# Patient Record
Sex: FEMALE | Race: BLACK OR AFRICAN AMERICAN | Employment: FULL TIME | URBAN - METROPOLITAN AREA
[De-identification: names, ages, dates, MRNs, and addresses within clinical notes are randomized per-mention and may not be internally consistent; named-entity substitution may affect disease eponyms.]

---

## 2017-09-13 ENCOUNTER — HOSPITAL ENCOUNTER (OUTPATIENT)
Dept: GENERAL RADIOLOGY | Facility: HOSPITAL | Age: 32
Discharge: HOME | End: 2017-09-13
Attending: PREVENTIVE MEDICINE
Payer: OTHER MISCELLANEOUS

## 2017-09-13 ENCOUNTER — OFFICE VISIT (OUTPATIENT)
Dept: OTHER | Facility: HOSPITAL | Age: 32
End: 2017-09-13
Attending: PREVENTIVE MEDICINE
Payer: OTHER MISCELLANEOUS

## 2017-09-13 DIAGNOSIS — M25.571 ACUTE RIGHT ANKLE PAIN: ICD-10-CM

## 2017-09-13 DIAGNOSIS — M79.671 RIGHT FOOT PAIN: Primary | ICD-10-CM

## 2017-09-13 PROCEDURE — 73610 X-RAY EXAM OF ANKLE: CPT | Performed by: PREVENTIVE MEDICINE

## 2017-09-13 PROCEDURE — 73630 X-RAY EXAM OF FOOT: CPT | Performed by: PREVENTIVE MEDICINE

## 2017-09-15 ENCOUNTER — APPOINTMENT (OUTPATIENT)
Dept: OTHER | Facility: HOSPITAL | Age: 32
End: 2017-09-15
Attending: PREVENTIVE MEDICINE
Payer: OTHER MISCELLANEOUS

## 2017-09-20 ENCOUNTER — APPOINTMENT (OUTPATIENT)
Dept: OTHER | Facility: HOSPITAL | Age: 32
End: 2017-09-20
Attending: PREVENTIVE MEDICINE
Payer: OTHER MISCELLANEOUS

## 2020-02-22 ENCOUNTER — HOSPITAL ENCOUNTER (OUTPATIENT)
Age: 35
Discharge: HOME | End: 2020-02-22
Payer: COMMERCIAL

## 2020-02-22 VITALS
OXYGEN SATURATION: 100 % | RESPIRATION RATE: 16 BRPM | DIASTOLIC BLOOD PRESSURE: 70 MMHG | TEMPERATURE: 101 F | SYSTOLIC BLOOD PRESSURE: 110 MMHG | HEART RATE: 112 BPM

## 2020-02-22 DIAGNOSIS — J11.1 INFLUENZA: Primary | ICD-10-CM

## 2020-02-22 LAB
POCT INFLUENZA A: POSITIVE
POCT INFLUENZA B: NEGATIVE

## 2020-02-22 PROCEDURE — 99204 OFFICE O/P NEW MOD 45 MIN: CPT

## 2020-02-22 PROCEDURE — 99213 OFFICE O/P EST LOW 20 MIN: CPT

## 2020-02-22 PROCEDURE — 87502 INFLUENZA DNA AMP PROBE: CPT | Performed by: PHYSICIAN ASSISTANT

## 2020-02-22 RX ORDER — CODEINE PHOSPHATE AND GUAIFENESIN 10; 100 MG/5ML; MG/5ML
5 SOLUTION ORAL EVERY 6 HOURS PRN
Qty: 118 ML | Refills: 0 | Status: SHIPPED | OUTPATIENT
Start: 2020-02-22

## 2020-02-22 NOTE — ED PROVIDER NOTES
Patient Seen in: [address] [facility] In [facility]      History   Patient presents with: Body ache and/or chills  Runny Nose  Ear Pain  Headache    Stated Complaint: Flu Like Symptoms    HPI    Patient is a pleasant 74-year-old female.   Remote histor stridor to auscultation  Lung: No distress, RR, no retraction, breath sounds are clear bilaterally  Cardio: Sinus tachycardia, normal S1-S2, no murmur appreciable  Extremities: Full ROM, no deformity, NVI  Back: Full range of motion  Skin: No sign of traum

## 2020-02-22 NOTE — ED INITIAL ASSESSMENT (HPI)
Runny nose, aches and chills, headache, runny nose, ear pain - x 2 days, felt feverish, no flu shot ( pt cannot swallow pills)

## 2020-02-27 ENCOUNTER — HOSPITAL ENCOUNTER (OUTPATIENT)
Age: 35
Discharge: HOME | End: 2020-02-27
Attending: FAMILY MEDICINE
Payer: COMMERCIAL

## 2020-02-27 VITALS
HEART RATE: 85 BPM | DIASTOLIC BLOOD PRESSURE: 68 MMHG | SYSTOLIC BLOOD PRESSURE: 109 MMHG | OXYGEN SATURATION: 100 % | TEMPERATURE: 98 F | RESPIRATION RATE: 18 BRPM

## 2020-02-27 DIAGNOSIS — J11.1 INFLUENZA: Primary | ICD-10-CM

## 2020-02-27 PROCEDURE — 99211 OFF/OP EST MAY X REQ PHY/QHP: CPT

## 2020-02-28 NOTE — ED INITIAL ASSESSMENT (HPI)
Patient requesting a return to work note after being seen in the [facility] 2/22 testing positive Influenza A+.  {ateomt has been fever free for over 24 hours but c/o lingering cough

## 2020-02-28 NOTE — ED PROVIDER NOTES
Patient Seen in: [address] [facility] In [facility]      History   Patient presents with: Other    Stated Complaint: Matilda Mehta note    HPI    42-year-old female presents for a work note.   Patient states she was seen here on 2/22/2020 for URI sympto Normal range of motion and neck supple. Cardiovascular:      Rate and Rhythm: Normal rate and regular rhythm. Heart sounds: Normal heart sounds. Pulmonary:      Effort: Pulmonary effort is normal.      Breath sounds: Normal breath sounds.    Skin:

## 2020-10-29 ENCOUNTER — APPOINTMENT (OUTPATIENT)
Dept: GENERAL RADIOLOGY | Age: 35
End: 2020-10-29
Attending: EMERGENCY MEDICINE
Payer: COMMERCIAL

## 2020-10-29 ENCOUNTER — HOSPITAL ENCOUNTER (OUTPATIENT)
Age: 35
Discharge: HOME | End: 2020-10-29
Attending: EMERGENCY MEDICINE
Payer: COMMERCIAL

## 2020-10-29 VITALS
HEART RATE: 91 BPM | WEIGHT: 130 LBS | SYSTOLIC BLOOD PRESSURE: 113 MMHG | RESPIRATION RATE: 16 BRPM | DIASTOLIC BLOOD PRESSURE: 65 MMHG | OXYGEN SATURATION: 99 % | TEMPERATURE: 99 F

## 2020-10-29 DIAGNOSIS — B34.9 VIRAL SYNDROME: Primary | ICD-10-CM

## 2020-10-29 PROCEDURE — 93005 ELECTROCARDIOGRAM TRACING: CPT

## 2020-10-29 PROCEDURE — 99214 OFFICE O/P EST MOD 30 MIN: CPT

## 2020-10-29 PROCEDURE — 93010 ELECTROCARDIOGRAM REPORT: CPT

## 2020-10-29 PROCEDURE — 71046 X-RAY EXAM CHEST 2 VIEWS: CPT | Performed by: EMERGENCY MEDICINE

## 2020-10-29 NOTE — ED PROVIDER NOTES
Patient Seen in: [facility] [place] [facility]      History   Patient presents with:  Cough/URI    Stated Complaint: Chest pain,chills,sore throat,cough    HPI    Patient is a 45-year-old female comes emergency room for evaluation of cold symptoms and chest appearing and non-toxic, Alert and oriented X 3   HEENT: Normocephalic, atraumatic. Moist mucous membranes. Pupils equal round reactive to light and accommodation, extraocular motion is intact, sclerae white, conjunctiva is pink.   Oropharynx is [place] and [place] swab was performed. Symptoms not typical of angina. Patient advised to self isolate until Covid test is resulted. Antibiotics not indicated. Patient  is under 50. Patient is not on hormones. pulsox >94%.   No hemoptysis, no unilateral leg swelling, no

## 2020-10-31 NOTE — ED NOTES
Call placed to pt. Notified pt of positive Covid test result. Advised pt to self quarantine for 10 more days. Then if afebrile for 24 hours, may return to work or school. Encouraged rest, fluids. Tylenol for fever or pain control.  Worsening symptoms go to

## 2022-03-31 NOTE — ED QUICK NOTES
Warm blankets provided, lights dimmed for comfort. Bed on low fowlers. Family at bedside. IV infusing well, call light within reach instructed to call if pt needs anything.

## 2022-03-31 NOTE — ED INITIAL ASSESSMENT (HPI)
Dizziness- started at 730 am today described as \"room spinning\" . Also c/o nausea and headache, feel s she is going to fall when walking. Denies vomiting.  Took nyquil last night for the runny nose

## 2022-03-31 NOTE — ED QUICK NOTES
Pt walked the hallway and back to room  With steady gait. Felt slightly dizzy in the beginning but subsided. IV restarted and infusing well. VS rechecked.  Denies any headache or nausea,  No adverse reaction noted from meds given

## (undated) NOTE — LETTER
Date & Time: 11/10/2020, 11:53 AM  Patient: Shanta Hams  Encounter Provider(s):    Korina Marie MD       To Whom It May Concern:    Denita Wright was seen and treated in our department on 10/29/2020.  She can return to work as she is symptom-f

## (undated) NOTE — LETTER
Date & Time: 2/22/2020, 1:14 PM  Patient: Patrice Hemp  Encounter Provider(s):    Lynsey Cohen, [address]       To Whom It May Concern:    Agus Chand was seen and treated in our department on 2/22/2020.   Influenza precautions; may not return to wor

## (undated) NOTE — LETTER
Date & Time: 3/31/2022, 2:02 PM  Patient: Jason Montero  Encounter Provider(s):    KEYSHAWN Singh       To Whom It May Concern:    Freda Lanier was seen and treated in our department on 3/31/2022. She should not return to work until April 5, 2022.     If you have any questions or concerns, please do not hesitate to call.        _____________________________  Physician/APC Signature

## (undated) NOTE — LETTER
Date & Time: 2/27/2020, 7:11 PM  Patient: Carlene Saravia  Encounter Provider(s):    Jose Manuel Villeda MD       To Whom It May Concern:    Zaida Morris was seen and treated in our department on 2/27/2020. She can return to work on 02/28/20.

## (undated) NOTE — LETTER
Date & Time: 10/29/2020, 1:34 PM  Patient: Rubi Rosen  Encounter Provider(s):    Agus Hall MD       To Whom It May Concern:    Lety Trevino was seen and treated in our department on 10/29/2020.  She should not return to work until 11/2/20